# Patient Record
Sex: FEMALE | Race: OTHER | HISPANIC OR LATINO | URBAN - METROPOLITAN AREA
[De-identification: names, ages, dates, MRNs, and addresses within clinical notes are randomized per-mention and may not be internally consistent; named-entity substitution may affect disease eponyms.]

---

## 2021-08-22 DIAGNOSIS — Z01.818 ENCOUNTER FOR OTHER PREPROCEDURAL EXAMINATION: ICD-10-CM

## 2021-08-22 PROBLEM — Z00.00 ENCOUNTER FOR PREVENTIVE HEALTH EXAMINATION: Status: ACTIVE | Noted: 2021-08-22

## 2021-08-26 ENCOUNTER — OUTPATIENT (OUTPATIENT)
Dept: OUTPATIENT SERVICES | Facility: HOSPITAL | Age: 36
LOS: 1 days | End: 2021-08-26

## 2021-08-26 DIAGNOSIS — Z01.818 ENCOUNTER FOR OTHER PREPROCEDURAL EXAMINATION: ICD-10-CM

## 2021-08-26 LAB
BASOPHILS # BLD AUTO: 0.02 K/UL — SIGNIFICANT CHANGE UP (ref 0–0.2)
BASOPHILS NFR BLD AUTO: 0.2 % — SIGNIFICANT CHANGE UP (ref 0–2)
BLD GP AB SCN SERPL QL: SIGNIFICANT CHANGE UP
EOSINOPHIL # BLD AUTO: 0.14 K/UL — SIGNIFICANT CHANGE UP (ref 0–0.5)
EOSINOPHIL NFR BLD AUTO: 1.2 % — SIGNIFICANT CHANGE UP (ref 0–6)
HCT VFR BLD CALC: 31.3 % — LOW (ref 34.5–45)
HGB BLD-MCNC: 10.7 G/DL — LOW (ref 11.5–15.5)
HIV 1 & 2 AB SERPL IA.RAPID: SIGNIFICANT CHANGE UP
IMM GRANULOCYTES NFR BLD AUTO: 0.7 % — SIGNIFICANT CHANGE UP (ref 0–1.5)
LYMPHOCYTES # BLD AUTO: 2.65 K/UL — SIGNIFICANT CHANGE UP (ref 1–3.3)
LYMPHOCYTES # BLD AUTO: 23.4 % — SIGNIFICANT CHANGE UP (ref 13–44)
MCHC RBC-ENTMCNC: 29.6 PG — SIGNIFICANT CHANGE UP (ref 27–34)
MCHC RBC-ENTMCNC: 34.2 GM/DL — SIGNIFICANT CHANGE UP (ref 32–36)
MCV RBC AUTO: 86.7 FL — SIGNIFICANT CHANGE UP (ref 80–100)
MONOCYTES # BLD AUTO: 0.95 K/UL — HIGH (ref 0–0.9)
MONOCYTES NFR BLD AUTO: 8.4 % — SIGNIFICANT CHANGE UP (ref 2–14)
NEUTROPHILS # BLD AUTO: 7.49 K/UL — HIGH (ref 1.8–7.4)
NEUTROPHILS NFR BLD AUTO: 66.1 % — SIGNIFICANT CHANGE UP (ref 43–77)
PLATELET # BLD AUTO: 222 K/UL — SIGNIFICANT CHANGE UP (ref 150–400)
RBC # BLD: 3.61 M/UL — LOW (ref 3.8–5.2)
RBC # FLD: 13.8 % — SIGNIFICANT CHANGE UP (ref 10.3–14.5)
WBC # BLD: 11.33 K/UL — HIGH (ref 3.8–10.5)
WBC # FLD AUTO: 11.33 K/UL — HIGH (ref 3.8–10.5)

## 2021-08-27 ENCOUNTER — APPOINTMENT (OUTPATIENT)
Dept: DISASTER EMERGENCY | Facility: CLINIC | Age: 36
End: 2021-08-27

## 2021-08-27 LAB — HIV 1+2 AB+HIV1 P24 AG SERPL QL IA: SIGNIFICANT CHANGE UP

## 2021-08-28 LAB — SARS-COV-2 N GENE NPH QL NAA+PROBE: NOT DETECTED

## 2021-08-29 ENCOUNTER — TRANSCRIPTION ENCOUNTER (OUTPATIENT)
Age: 36
End: 2021-08-29

## 2021-08-30 ENCOUNTER — INPATIENT (INPATIENT)
Facility: HOSPITAL | Age: 36
LOS: 1 days | Discharge: HOME | End: 2021-09-01
Attending: OBSTETRICS & GYNECOLOGY | Admitting: OBSTETRICS & GYNECOLOGY
Payer: COMMERCIAL

## 2021-08-30 ENCOUNTER — RESULT REVIEW (OUTPATIENT)
Age: 36
End: 2021-08-30

## 2021-08-30 ENCOUNTER — TRANSCRIPTION ENCOUNTER (OUTPATIENT)
Age: 36
End: 2021-08-30

## 2021-08-30 VITALS
HEART RATE: 74 BPM | WEIGHT: 190.04 LBS | HEIGHT: 62 IN | DIASTOLIC BLOOD PRESSURE: 57 MMHG | RESPIRATION RATE: 20 BRPM | TEMPERATURE: 99 F | SYSTOLIC BLOOD PRESSURE: 104 MMHG

## 2021-08-30 DIAGNOSIS — O34.219 MATERNAL CARE FOR UNSPECIFIED TYPE SCAR FROM PREVIOUS CESAREAN DELIVERY: ICD-10-CM

## 2021-08-30 LAB
BLD GP AB SCN SERPL QL: SIGNIFICANT CHANGE UP
COVID-19 SPIKE DOMAIN AB INTERP: POSITIVE
COVID-19 SPIKE DOMAIN ANTIBODY RESULT: 82.8 U/ML — HIGH
GLUCOSE BLDC GLUCOMTR-MCNC: 128 MG/DL — HIGH (ref 70–99)
GLUCOSE BLDC GLUCOMTR-MCNC: 93 MG/DL — SIGNIFICANT CHANGE UP (ref 70–99)
MEV IGG SER-ACNC: 82.8 AU/ML — SIGNIFICANT CHANGE UP
MEV IGG+IGM SER-IMP: POSITIVE — SIGNIFICANT CHANGE UP
SARS-COV-2 IGG+IGM SERPL QL IA: 82.8 U/ML — HIGH
SARS-COV-2 IGG+IGM SERPL QL IA: POSITIVE
T PALLIDUM AB TITR SER: NEGATIVE — SIGNIFICANT CHANGE UP

## 2021-08-30 PROCEDURE — 88302 TISSUE EXAM BY PATHOLOGIST: CPT | Mod: 26

## 2021-08-30 PROCEDURE — 88307 TISSUE EXAM BY PATHOLOGIST: CPT | Mod: 26

## 2021-08-30 RX ORDER — OXYCODONE HYDROCHLORIDE 5 MG/1
5 TABLET ORAL ONCE
Refills: 0 | Status: DISCONTINUED | OUTPATIENT
Start: 2021-08-30 | End: 2021-09-01

## 2021-08-30 RX ORDER — ACETAMINOPHEN 500 MG
975 TABLET ORAL
Refills: 0 | Status: DISCONTINUED | OUTPATIENT
Start: 2021-08-30 | End: 2021-09-01

## 2021-08-30 RX ORDER — CITRIC ACID/SODIUM CITRATE 300-500 MG
30 SOLUTION, ORAL ORAL ONCE
Refills: 0 | Status: COMPLETED | OUTPATIENT
Start: 2021-08-30 | End: 2021-08-30

## 2021-08-30 RX ORDER — KETOROLAC TROMETHAMINE 30 MG/ML
30 SYRINGE (ML) INJECTION EVERY 6 HOURS
Refills: 0 | Status: DISCONTINUED | OUTPATIENT
Start: 2021-08-30 | End: 2021-08-31

## 2021-08-30 RX ORDER — ENOXAPARIN SODIUM 100 MG/ML
40 INJECTION SUBCUTANEOUS EVERY 24 HOURS
Refills: 0 | Status: DISCONTINUED | OUTPATIENT
Start: 2021-08-30 | End: 2021-09-01

## 2021-08-30 RX ORDER — SODIUM CHLORIDE 9 MG/ML
1000 INJECTION, SOLUTION INTRAVENOUS
Refills: 0 | Status: DISCONTINUED | OUTPATIENT
Start: 2021-08-30 | End: 2021-09-01

## 2021-08-30 RX ORDER — DIPHENHYDRAMINE HCL 50 MG
25 CAPSULE ORAL EVERY 6 HOURS
Refills: 0 | Status: DISCONTINUED | OUTPATIENT
Start: 2021-08-30 | End: 2021-09-01

## 2021-08-30 RX ORDER — SIMETHICONE 80 MG/1
80 TABLET, CHEWABLE ORAL EVERY 4 HOURS
Refills: 0 | Status: DISCONTINUED | OUTPATIENT
Start: 2021-08-30 | End: 2021-09-01

## 2021-08-30 RX ORDER — OXYTOCIN 10 UNIT/ML
333.33 VIAL (ML) INJECTION
Qty: 20 | Refills: 0 | Status: DISCONTINUED | OUTPATIENT
Start: 2021-08-30 | End: 2021-09-01

## 2021-08-30 RX ORDER — LANOLIN
1 OINTMENT (GRAM) TOPICAL EVERY 6 HOURS
Refills: 0 | Status: DISCONTINUED | OUTPATIENT
Start: 2021-08-30 | End: 2021-09-01

## 2021-08-30 RX ORDER — MAGNESIUM HYDROXIDE 400 MG/1
30 TABLET, CHEWABLE ORAL
Refills: 0 | Status: DISCONTINUED | OUTPATIENT
Start: 2021-08-30 | End: 2021-09-01

## 2021-08-30 RX ORDER — IBUPROFEN 200 MG
600 TABLET ORAL EVERY 6 HOURS
Refills: 0 | Status: COMPLETED | OUTPATIENT
Start: 2021-08-30 | End: 2022-07-29

## 2021-08-30 RX ORDER — CEFAZOLIN SODIUM 1 G
2000 VIAL (EA) INJECTION ONCE
Refills: 0 | Status: COMPLETED | OUTPATIENT
Start: 2021-08-30 | End: 2021-08-30

## 2021-08-30 RX ORDER — OXYCODONE HYDROCHLORIDE 5 MG/1
5 TABLET ORAL
Refills: 0 | Status: DISCONTINUED | OUTPATIENT
Start: 2021-08-30 | End: 2021-09-01

## 2021-08-30 RX ORDER — TETANUS TOXOID, REDUCED DIPHTHERIA TOXOID AND ACELLULAR PERTUSSIS VACCINE, ADSORBED 5; 2.5; 8; 8; 2.5 [IU]/.5ML; [IU]/.5ML; UG/.5ML; UG/.5ML; UG/.5ML
0.5 SUSPENSION INTRAMUSCULAR ONCE
Refills: 0 | Status: COMPLETED | OUTPATIENT
Start: 2021-08-30

## 2021-08-30 RX ORDER — FAMOTIDINE 10 MG/ML
20 INJECTION INTRAVENOUS ONCE
Refills: 0 | Status: COMPLETED | OUTPATIENT
Start: 2021-08-30 | End: 2021-08-30

## 2021-08-30 RX ORDER — SODIUM CHLORIDE 9 MG/ML
1000 INJECTION, SOLUTION INTRAVENOUS ONCE
Refills: 0 | Status: COMPLETED | OUTPATIENT
Start: 2021-08-30 | End: 2021-08-30

## 2021-08-30 RX ORDER — SODIUM CHLORIDE 9 MG/ML
1000 INJECTION, SOLUTION INTRAVENOUS
Refills: 0 | Status: DISCONTINUED | OUTPATIENT
Start: 2021-08-30 | End: 2021-08-30

## 2021-08-30 RX ADMIN — Medication 30 MILLIGRAM(S): at 23:53

## 2021-08-30 RX ADMIN — SODIUM CHLORIDE 125 MILLILITER(S): 9 INJECTION, SOLUTION INTRAVENOUS at 21:56

## 2021-08-30 RX ADMIN — SODIUM CHLORIDE 125 MILLILITER(S): 9 INJECTION, SOLUTION INTRAVENOUS at 07:06

## 2021-08-30 RX ADMIN — Medication 30 MILLILITER(S): at 07:20

## 2021-08-30 RX ADMIN — Medication 1000 MILLIUNIT(S)/MIN: at 08:31

## 2021-08-30 RX ADMIN — Medication 30 MILLIGRAM(S): at 22:53

## 2021-08-30 RX ADMIN — Medication 975 MILLIGRAM(S): at 21:50

## 2021-08-30 RX ADMIN — Medication 975 MILLIGRAM(S): at 20:56

## 2021-08-30 RX ADMIN — SODIUM CHLORIDE 2000 MILLILITER(S): 9 INJECTION, SOLUTION INTRAVENOUS at 06:47

## 2021-08-30 RX ADMIN — Medication 100 MILLIGRAM(S): at 08:05

## 2021-08-30 RX ADMIN — Medication 975 MILLIGRAM(S): at 16:35

## 2021-08-30 RX ADMIN — Medication 30 MILLIGRAM(S): at 17:01

## 2021-08-30 RX ADMIN — FAMOTIDINE 20 MILLIGRAM(S): 10 INJECTION INTRAVENOUS at 07:20

## 2021-08-30 RX ADMIN — Medication 30 MILLIGRAM(S): at 11:17

## 2021-08-30 RX ADMIN — Medication 975 MILLIGRAM(S): at 15:47

## 2021-08-30 RX ADMIN — Medication 30 MILLIGRAM(S): at 11:02

## 2021-08-30 RX ADMIN — Medication 30 MILLIGRAM(S): at 18:00

## 2021-08-30 RX ADMIN — ENOXAPARIN SODIUM 40 MILLIGRAM(S): 100 INJECTION SUBCUTANEOUS at 20:56

## 2021-08-30 NOTE — DISCHARGE NOTE OB - CARE PROVIDER_API CALL
Joselyn Curiel)  Obstetrics and Gynecology  [address] [place] [address]  Phone: [phone]  Fax: [phone]  Follow Up Time:

## 2021-08-30 NOTE — OB PROVIDER H&P - NSHPPHYSICALEXAM_GEN_ALL_CORE
Vital Signs Last 24 Hrs  T(C): 37.1 (30 Aug 2021 06:31), Max: 37.1 (30 Aug 2021 06:27)  T(F): 98.78 (30 Aug 2021 06:31), Max: 98.78 (30 Aug 2021 06:31)  HR: 74 (30 Aug 2021 06:44) (74 - 74)  BP: 104/57 (30 Aug 2021 06:44) (104/57 - 104/57)  RR: 20 (30 Aug 2021 06:31) (20 - 20)    Gen: no acute distress  CV: regular rate and ryhthmn   Pulm: clear bilaterally to auscultation  Abd: nontender; gravid  Ext: no calf tenderness; +1 swelling     Tracing: baseline 120, moderate variability, +accels, no decels  Guerra: irregular ctx <5 in 10 mins  SVE: deferred  Ultrasound: vertex

## 2021-08-30 NOTE — OB PROVIDER H&P - ASSESSMENT
Patient is a 36y.o.  at 39 weeks gestation admitted to L&D for repeat  section.       - consent  - admission labs  - Diet: NPO  - IV hydration  - continuous toco and fetal heart monitoring until surgery  - preop antibiotics 2g ancef  - GBS unknown    To be discussed with  ___ Patient is a 36y.o.  at 39 weeks gestation admitted to L&D for repeat  section.       - consent  - admission labs  - Diet: NPO  - IV hydration  - continuous toco and fetal heart monitoring until surgery  - preop antibiotics 2g ancef  - NST reactive    discussed with attending Dr. Curiel

## 2021-08-30 NOTE — OB RN DELIVERY SUMMARY - NSSELHIDDEN_OBGYN_ALL_OB_FT
[NS_DeliveryRN_OBGYN_ALL_OB_FT:[email or web address]=] [NS_DeliveryRN_OBGYN_ALL_OB_FT:[email or web address]=],[NS_DeliveryAttending1_OBGYN_ALL_OB_FT:[id number]]

## 2021-08-30 NOTE — DISCHARGE NOTE OB - CARE PLAN
Principal Discharge DX:	 delivery delivered  Assessment and plan of treatment:	Patient should transition to regular activity level. Resume regular diet. Patient should follow up with her OB for a postpartum checkup 1-2 weeks after delivery. Patient should call her doctor sooner if she develops a fever or uncontrolled vaginal bleeding. Please call sooner if there are any other concerns.   1

## 2021-08-30 NOTE — DISCHARGE NOTE OB - PATIENT PORTAL LINK FT
You can access the FollowMyHealth Patient Portal offered by [facility] by registering at the following website: [email or web address]. By joining [email or web address]’s FollowMyHealth portal, you will also be able to view your health information using other applications (apps) compatible with our system.

## 2021-08-30 NOTE — DISCHARGE NOTE OB - MEDICATION SUMMARY - MEDICATIONS TO TAKE
I will START or STAY ON the medications listed below when I get home from the hospital:    ibuprofen 600 mg oral tablet  -- 1 tab(s) by mouth every 6 hours MDD:4  -- Do not take this drug if you are pregnant.  It is very important that you take or use this exactly as directed.  Do not skip doses or discontinue unless directed by your doctor.  May cause drowsiness or dizziness.  Obtain medical advice before taking any non-prescription drugs as some may affect the action of this medication.  Take with food or milk.    -- Indication: For PAIN    oxycodone-acetaminophen 5 mg-325 mg oral tablet  -- 1 tab(s) by mouth every 8 hours MDD:3  -- Caution federal law prohibits the transfer of this drug to any person other  than the person for whom it was prescribed.  May cause drowsiness.  Alcohol may intensify this effect.  Use care when operating dangerous machinery.  This prescription cannot be refilled.  This product contains acetaminophen.  Do not use  with any other product containing acetaminophen to prevent possible liver damage.  Using more of this medication than prescribed may cause serious breathing problems.    -- Indication: For PAIN    MiraLax oral powder for reconstitution  -- 17 gram(s) by mouth once a day   -- Dilute this medication with liquid before administration.  It is very important that you take or use this exactly as directed.  Do not skip doses or discontinue unless directed by your doctor.    -- Indication: For CONSTIPATION

## 2021-08-30 NOTE — OB RN DELIVERY SUMMARY - NS_SEPSISRSKCALC_OBGYN_ALL_OB_FT
EOS calculated successfully. EOS Risk Factor: 0.5/1000 live births ([facility] national incidence); GA=39w;Temp=98.78; ROM=0.017; GBS='Unknown'; Antibiotics='No antibiotics or any antibiotics < 2 hrs prior to birth'

## 2021-08-30 NOTE — DISCHARGE NOTE OB - PLAN OF CARE
Patient should transition to regular activity level. Resume regular diet. Patient should follow up with her OB for a postpartum checkup 1-2 weeks after delivery. Patient should call her doctor sooner if she develops a fever or uncontrolled vaginal bleeding. Please call sooner if there are any other concerns.

## 2021-08-30 NOTE — DISCHARGE NOTE OB - HOSPITAL COURSE
LANCE LAW is a 36y  s/p uncomplicated vaccuum assisted repeat CS and bilateral salpingectomy POD #2                          7.8    16.27 )-----------( 175      ( 31 Aug 2021 06:20 )             23.3

## 2021-08-30 NOTE — OB NEONATOLOGY/PEDIATRICIAN DELIVERY SUMMARY - NSPEDSNEONOTESA_OBGYN_ALL_OB_FT
Called to repeat  for vacuum at 39w0d to a 35yo  mother, blood type AB+, GBS unknown, remaining serologies negative.  Infant delivered vigorous with spontaneous cry.  Delayed cord clamping x 20 seconds.  Brought to warmer, dried/stimulated, then developed secondary apnea around 3 min of life requiring PPV ~15 sec by nurse.  NICU attending arrived shortly after discontinuation of PPV.  Female infant well-appearing, transitioning well, no distress, exam grossly unremarkable.  Pre-ductal saturation appropriate for minute of life.  Plan for routine  care if remains well-appearing.

## 2021-08-30 NOTE — OB PROVIDER H&P - HISTORY OF PRESENT ILLNESS
Patient is a 36 year old  at 39 weeks by first trimester sono GA by who presents to L&D for repeat C/S and BS.     RIAN: 21   LMP: 20     Pregnancy course:     GDMA1   Proteinuria   AMA   CF carrier   BV     Obhx:   pC/S (2018) @ 39wks – Male, C/b FGR and GDM   GynHx: Hx of BV, HSV   Pmhx: denies   Pshx: denies   Meds: PNV, Vit D, Calcium   Allergies: Latex - rash   SH: denies     BMI:    Ultrasound: vertex, anterior placenta 21   EFW: 2222g on   Patient is a 36 year old  at 39 weeks by first trimester sono GA by who presents to L&D for repeat C/S and BS.   Denies vaginal bleeding, loss of fluid or regular contractions. +fetal movement, unchanged.     RIAN: 21   LMP: 20     Pregnancy course:     GDMA1   Proteinuria   AMA   CF carrier   BV     Obhx:   pC/S (2018) @ 39wks – Male, C/b FGR and GDM   GynHx: Hx of BV, HSV   Pmhx: denies   Pshx: denies   Meds: PNV, Vit D, Calcium   Allergies: Latex - rash   SH: denies     BMI:    Ultrasound: vertex, anterior placenta 21   EFW: 2222g on

## 2021-08-30 NOTE — OB PROVIDER DELIVERY SUMMARY - NSPROVIDERDELIVERYNOTE_OBGYN_ALL_OB_FT
Pt taken to the OR for repeat C/S scheduled.  Not in labor.  Spinal anesthesia.  Low transverse  section was performed.  Vaccuum-assisted with kiwi vaccuum with one pull, no popoffs due to difficulty delivery through hysterotomy of vertex presentation with anterior placenta and.   Delivered live female infant, vtx, through moderate amount of clear amniotic fluid.  No nuchal cord.  Uterus, tubes, ovaries WNL.   Bilateral salpingectomy with ligature performed.  Hysterotomy was reapproximated with suture, excellent hemostasis obtained.  fascia was reapproximated with suture, excellent hemostasis obtained.  skin closed in subcuticular fashion    [place] weight [id number]  Skin-to-skin initiated in OR and continued into RR.  APGAR 8/9.   QBL: 622  Uop: 575  No intraoperative complications

## 2021-08-30 NOTE — OB RN INTRAOPERATIVE NOTE - NSSELHIDDEN_OBGYN_ALL_OB_FT
[NS_DeliveryRN_OBGYN_ALL_OB_FT:[email or web address]=] [NS_DeliveryRN_OBGYN_ALL_OB_FT:[email or web address]=],[NS_DeliveryAssist1_OBGYN_ALL_OB_FT:[id number]=]

## 2021-08-30 NOTE — OB RN DELIVERY SUMMARY - BABY A: WEIGHT IN POUNDS (FROM GRAMS), DELIVERY
Patient is checking on the status of his med request. Patient will be out.  Patient is aware that his PCP is out 11/8/19.   6

## 2021-08-31 LAB
BASOPHILS # BLD AUTO: 0.04 K/UL — SIGNIFICANT CHANGE UP (ref 0–0.2)
BASOPHILS NFR BLD AUTO: 0.2 % — SIGNIFICANT CHANGE UP (ref 0–2)
EOSINOPHIL # BLD AUTO: 0.03 K/UL — SIGNIFICANT CHANGE UP (ref 0–0.5)
EOSINOPHIL NFR BLD AUTO: 0.2 % — SIGNIFICANT CHANGE UP (ref 0–6)
GLUCOSE BLDC GLUCOMTR-MCNC: 103 MG/DL — HIGH (ref 70–99)
GLUCOSE BLDC GLUCOMTR-MCNC: 137 MG/DL — HIGH (ref 70–99)
HCT VFR BLD CALC: 23.3 % — LOW (ref 34.5–45)
HGB BLD-MCNC: 7.8 G/DL — LOW (ref 11.5–15.5)
IMM GRANULOCYTES NFR BLD AUTO: 0.6 % — SIGNIFICANT CHANGE UP (ref 0–1.5)
LYMPHOCYTES # BLD AUTO: 26.2 % — SIGNIFICANT CHANGE UP (ref 13–44)
LYMPHOCYTES # BLD AUTO: 4.26 K/UL — HIGH (ref 1–3.3)
MCHC RBC-ENTMCNC: 29.7 PG — SIGNIFICANT CHANGE UP (ref 27–34)
MCHC RBC-ENTMCNC: 33.5 GM/DL — SIGNIFICANT CHANGE UP (ref 32–36)
MCV RBC AUTO: 88.6 FL — SIGNIFICANT CHANGE UP (ref 80–100)
MONOCYTES # BLD AUTO: 1.26 K/UL — HIGH (ref 0–0.9)
MONOCYTES NFR BLD AUTO: 7.7 % — SIGNIFICANT CHANGE UP (ref 2–14)
NEUTROPHILS # BLD AUTO: 10.59 K/UL — HIGH (ref 1.8–7.4)
NEUTROPHILS NFR BLD AUTO: 65.1 % — SIGNIFICANT CHANGE UP (ref 43–77)
PLATELET # BLD AUTO: 175 K/UL — SIGNIFICANT CHANGE UP (ref 150–400)
RBC # BLD: 2.63 M/UL — LOW (ref 3.8–5.2)
RBC # FLD: 13.8 % — SIGNIFICANT CHANGE UP (ref 10.3–14.5)
WBC # BLD: 16.27 K/UL — HIGH (ref 3.8–10.5)
WBC # FLD AUTO: 16.27 K/UL — HIGH (ref 3.8–10.5)

## 2021-08-31 RX ORDER — IRON SUCROSE 20 MG/ML
300 INJECTION, SOLUTION INTRAVENOUS ONCE
Refills: 0 | Status: COMPLETED | OUTPATIENT
Start: 2021-08-31 | End: 2021-08-31

## 2021-08-31 RX ORDER — IBUPROFEN 200 MG
1 TABLET ORAL
Qty: 30 | Refills: 0
Start: 2021-08-31

## 2021-08-31 RX ORDER — POLYETHYLENE GLYCOL 3350 17 G/17G
17 POWDER, FOR SOLUTION ORAL
Qty: 119 | Refills: 0
Start: 2021-08-31 | End: 2021-09-06

## 2021-08-31 RX ORDER — TETANUS TOXOID, REDUCED DIPHTHERIA TOXOID AND ACELLULAR PERTUSSIS VACCINE, ADSORBED 5; 2.5; 8; 8; 2.5 [IU]/.5ML; [IU]/.5ML; UG/.5ML; UG/.5ML; UG/.5ML
0.5 SUSPENSION INTRAMUSCULAR ONCE
Refills: 0 | Status: COMPLETED | OUTPATIENT
Start: 2021-08-31 | End: 2021-08-31

## 2021-08-31 RX ORDER — FERROUS SULFATE 325(65) MG
325 TABLET ORAL EVERY 24 HOURS
Refills: 0 | Status: DISCONTINUED | OUTPATIENT
Start: 2021-08-31 | End: 2021-08-31

## 2021-08-31 RX ORDER — IBUPROFEN 200 MG
600 TABLET ORAL EVERY 6 HOURS
Refills: 0 | Status: DISCONTINUED | OUTPATIENT
Start: 2021-08-31 | End: 2021-09-01

## 2021-08-31 RX ADMIN — SIMETHICONE 80 MILLIGRAM(S): 80 TABLET, CHEWABLE ORAL at 23:51

## 2021-08-31 RX ADMIN — Medication 975 MILLIGRAM(S): at 16:03

## 2021-08-31 RX ADMIN — Medication 600 MILLIGRAM(S): at 13:07

## 2021-08-31 RX ADMIN — Medication 975 MILLIGRAM(S): at 21:50

## 2021-08-31 RX ADMIN — Medication 600 MILLIGRAM(S): at 18:40

## 2021-08-31 RX ADMIN — Medication 600 MILLIGRAM(S): at 23:41

## 2021-08-31 RX ADMIN — Medication 975 MILLIGRAM(S): at 10:34

## 2021-08-31 RX ADMIN — Medication 30 MILLIGRAM(S): at 06:32

## 2021-08-31 RX ADMIN — ENOXAPARIN SODIUM 40 MILLIGRAM(S): 100 INJECTION SUBCUTANEOUS at 20:58

## 2021-08-31 RX ADMIN — Medication 975 MILLIGRAM(S): at 20:58

## 2021-08-31 RX ADMIN — Medication 600 MILLIGRAM(S): at 12:10

## 2021-08-31 RX ADMIN — Medication 975 MILLIGRAM(S): at 04:25

## 2021-08-31 RX ADMIN — IRON SUCROSE 176.67 MILLIGRAM(S): 20 INJECTION, SOLUTION INTRAVENOUS at 17:09

## 2021-08-31 RX ADMIN — Medication 30 MILLIGRAM(S): at 07:34

## 2021-08-31 RX ADMIN — Medication 975 MILLIGRAM(S): at 09:37

## 2021-08-31 RX ADMIN — Medication 975 MILLIGRAM(S): at 17:03

## 2021-08-31 RX ADMIN — TETANUS TOXOID, REDUCED DIPHTHERIA TOXOID AND ACELLULAR PERTUSSIS VACCINE, ADSORBED 0.5 MILLILITER(S): 5; 2.5; 8; 8; 2.5 SUSPENSION INTRAMUSCULAR at 05:41

## 2021-08-31 RX ADMIN — Medication 975 MILLIGRAM(S): at 03:24

## 2021-08-31 RX ADMIN — Medication 600 MILLIGRAM(S): at 18:09

## 2021-09-01 VITALS
RESPIRATION RATE: 18 BRPM | DIASTOLIC BLOOD PRESSURE: 76 MMHG | TEMPERATURE: 98 F | HEART RATE: 80 BPM | OXYGEN SATURATION: 98 % | SYSTOLIC BLOOD PRESSURE: 117 MMHG

## 2021-09-01 LAB
HCT VFR BLD CALC: 22.4 % — LOW (ref 34.5–45)
HGB BLD-MCNC: 7.8 G/DL — LOW (ref 11.5–15.5)
MCHC RBC-ENTMCNC: 30.4 PG — SIGNIFICANT CHANGE UP (ref 27–34)
MCHC RBC-ENTMCNC: 34.8 GM/DL — SIGNIFICANT CHANGE UP (ref 32–36)
MCV RBC AUTO: 87.2 FL — SIGNIFICANT CHANGE UP (ref 80–100)
PLATELET # BLD AUTO: 201 K/UL — SIGNIFICANT CHANGE UP (ref 150–400)
RBC # BLD: 2.57 M/UL — LOW (ref 3.8–5.2)
RBC # FLD: 13.9 % — SIGNIFICANT CHANGE UP (ref 10.3–14.5)
WBC # BLD: 13.96 K/UL — HIGH (ref 3.8–10.5)
WBC # FLD AUTO: 13.96 K/UL — HIGH (ref 3.8–10.5)

## 2021-09-01 PROCEDURE — 36415 COLL VENOUS BLD VENIPUNCTURE: CPT

## 2021-09-01 PROCEDURE — 76815 OB US LIMITED FETUS(S): CPT

## 2021-09-01 PROCEDURE — 88307 TISSUE EXAM BY PATHOLOGIST: CPT

## 2021-09-01 PROCEDURE — 86769 SARS-COV-2 COVID-19 ANTIBODY: CPT

## 2021-09-01 PROCEDURE — C1765: CPT

## 2021-09-01 PROCEDURE — 59025 FETAL NON-STRESS TEST: CPT

## 2021-09-01 PROCEDURE — 85027 COMPLETE CBC AUTOMATED: CPT

## 2021-09-01 PROCEDURE — 85025 COMPLETE CBC W/AUTO DIFF WBC: CPT

## 2021-09-01 PROCEDURE — 86850 RBC ANTIBODY SCREEN: CPT

## 2021-09-01 PROCEDURE — 82962 GLUCOSE BLOOD TEST: CPT

## 2021-09-01 PROCEDURE — 86765 RUBEOLA ANTIBODY: CPT

## 2021-09-01 PROCEDURE — 86901 BLOOD TYPING SEROLOGIC RH(D): CPT

## 2021-09-01 PROCEDURE — 86900 BLOOD TYPING SEROLOGIC ABO: CPT

## 2021-09-01 PROCEDURE — 90715 TDAP VACCINE 7 YRS/> IM: CPT

## 2021-09-01 PROCEDURE — 59050 FETAL MONITOR W/REPORT: CPT

## 2021-09-01 PROCEDURE — G0463: CPT

## 2021-09-01 PROCEDURE — 86780 TREPONEMA PALLIDUM: CPT

## 2021-09-01 PROCEDURE — 88302 TISSUE EXAM BY PATHOLOGIST: CPT

## 2021-09-01 RX ADMIN — Medication 600 MILLIGRAM(S): at 17:30

## 2021-09-01 RX ADMIN — Medication 600 MILLIGRAM(S): at 11:44

## 2021-09-01 RX ADMIN — Medication 600 MILLIGRAM(S): at 06:44

## 2021-09-01 RX ADMIN — Medication 975 MILLIGRAM(S): at 03:30

## 2021-09-01 RX ADMIN — Medication 975 MILLIGRAM(S): at 02:35

## 2021-09-01 RX ADMIN — Medication 600 MILLIGRAM(S): at 12:14

## 2021-09-01 RX ADMIN — Medication 600 MILLIGRAM(S): at 00:36

## 2021-09-01 RX ADMIN — Medication 600 MILLIGRAM(S): at 18:00

## 2021-09-01 RX ADMIN — Medication 600 MILLIGRAM(S): at 05:45

## 2021-09-01 NOTE — PROGRESS NOTE ADULT - ASSESSMENT
ASSESSMENT:  LANCE LAW is a 36y  s/p uncomplicated vaccuum assisted repeat CS and bilateral salpingectomy POD #2  female infant  Patient has no complaints at this time.  Incision healing well.  Rh status +  Rubella and Rubeola Immune  VSS     #anemia  Hgb 10.7-> 7.8 > f/u AM CBC  IV iron x1 given, to d/c with PO iron  asymptomatic    #leukocytosis  - resolved     #Postpartum   - Continue routine post-operative  and post-partum care  - Regular diet, advance as tolerated  - Continue with current pain management  - Encourage maternal- bonding  - Encourage ambulation  - Continue with SCDs and prophylactic Lovenox for DVT ppx  - Dispo: Home today with attending approval      
    ASSESSMENT:  LANCE LAW is a 36y  s/p uncomplicated vaccuum assisted repeat CS and bilateral salpingectomy POD #1  female infant  Patient has no complaints at this time.  Incision healing well.  Rh status +  Rubella and Rubeola Immune  VSS     #anemia  Hgb 10.7-> 7.8  PO iron started  asymptomatic    #failed TOV  -failed TOV overnight   bladder scanned 891cc overnight,  wagner replaced and to be discontinued at 6AM  pending TOV at approximately noon      #leukocytosis  -increased from 10>16.27  -afebrile  -continue to monitor    #Postpartum   - Continue routine post-operative  and post-partum care  - Regular diet, advance as tolerated  - Continue with current pain management  - Encourage maternal- bonding  - Encourage ambulation  - Continue with SCDs and prophylactic Lovenox for DVT ppx  - Dispo: continue inpatient care;

## 2021-09-01 NOTE — PROGRESS NOTE ADULT - SUBJECTIVE AND OBJECTIVE BOX
Delivery Post Partum Progress Note    LANCE LAW is a 36y  s/p uncomplicated vaccuum assisted repeat CS and bilateral salpingectomy POD #2  female infant    Patient was seen and examined at bedside.     SUBJECTIVE:  Reports feeling well this morning  Pain is well controlled with PRN pain medication  Tolerating PO without N/V  +flatus  +BM   Voiding spontaneously  Ambulating without assistance  Reports minimal lochia   Denies fevers, chills, shortness of breath, headaches, chest pain, vision changes or calf pain    OBJECTIVE:  Physical exam:  General: AOx3, NAD.  Heart: RRR. S1S2.  Lungs: CTABL. Good airflow bilaterally.   Abdomen: +BS, Soft, appropriately tender, mild distention; no guarding or rebound tenderness, firm uterine fundus at umbilicus  Incision: dry and intact with steri strips  Ext: No DVT signs, warm extremities.    Vital Signs Last 24 Hrs  T(C): 36.8 (01 Sep 2021 04:24), Max: 36.9 (31 Aug 2021 15:37)  T(F): 98.2 (01 Sep 2021 04:24), Max: 98.4 (31 Aug 2021 15:37)  HR: 94 (01 Sep 2021 04:24) (76 - 94)  BP: 132/80 (01 Sep 2021 04:24) (99/64 - 132/80)  BP(mean): --  RR: 18 (01 Sep 2021 04:24) (18 - 18)  SpO2: 97% (01 Sep 2021 04:24) (97% - 98%)    LABS:                        7.8    16.27 )-----------( 175      ( 31 Aug 2021 06:20 )             23.3       
 Delivery Post Partum Progress Note    LANCE LAW is a 36y  s/p uncomplicated vaccuum assisted repeat CS and bilateral salpingectomy POD #1  female infant      Patient was seen and examined at bedside.     SUBJECTIVE:  failed TOV overnight and bladder scanned 891cc overnight, wagner replaced   Reports feeling well this morning  Pain is well controlled with PRN pain medication  Tolerating PO without N/V  +flatus  No BM   Voiding spontaneously  Ambulating without assistance  Reports minimal lochia   Denies fevers, chills, shortness of breath, headaches, chest pain, vision changes or calf pain    OBJECTIVE:  Physical exam:  General: AOx3, NAD.  Heart: RRR. S1S2.  Lungs: CTABL. Good airflow bilaterally.   Abdomen: +BS, Soft, appropriately tender, mild distention; no guarding or rebound tenderness, firm uterine fundus at umbilicus  Incision: dry and intact with steri strips; dry serosanginous blood along s miguel strips. No erythema or discharge.  Ext: No DVT signs, warm extremities.    Vital Signs Last 24 Hrs  T(C): 36.7 (31 Aug 2021 07:55), Max: 37.1 (30 Aug 2021 16:07)  T(F): 98.1 (31 Aug 2021 07:55), Max: 98.7 (30 Aug 2021 16:07)  HR: 76 (31 Aug 2021 07:55) (65 - 85)  BP: 101/64 (31 Aug 2021 07:55) (99/50 - 122/77)  RR: 18 (31 Aug 2021 07:55) (14 - 18)  SpO2: 97% (31 Aug 2021 07:55) (96% - 99%)      21 @ 07:01  -  21 @ 07:00  --------------------------------------------------------  IN: 1100 mL / OUT: 5950 mL / NET: -4850 mL        LABS:                        7.8    16.27 )-----------( 175      ( 31 Aug 2021 06:20 )             23.3       
No complains  Pain controlled  tolerates PO, no flatus yet  Was not able to urinate yet  ambulating    afebrile  stable vitals  labs - mild anemia, Fs ok  abd soft, incision clean  uterus firm, lochia min  no calf tenderness    Plan  routine p/op care  IV iron today and tomorrow  repeat CBC tomorrow  Diabetic Diet  encourage ambulation  shower  vitamins  possible discharge home tomorrow

## 2021-09-03 LAB — SURGICAL PATHOLOGY STUDY: SIGNIFICANT CHANGE UP

## 2024-08-21 NOTE — OB RN PATIENT PROFILE - BREAST MILK IS MORE DIGESTIBLE, MAKING VOMITING, DIARRHEA, GAS AND CONSTIPATION LESS COMMON
Start the new medication for the restless legs-  take nightly before bed       Talk to Dr Agrawal about the [employer] for PRINCE        [facility] Financial Resources*  (Call [employer]/211 if need more resources.)      [employer] 211   Speak to a trained professional 24/7 who can connect you to essential community services including food, clothing, transportation, housing, utilities, employment services, childcare, and baby supplies. 211 serves nationwide.   [facility] for resources in [facility], [place] and [facility] in [place]; [place], [place], [place], and [facility] in [place].   [email or web address]/resources for resources in [facility], [place], [place], [place], [place], [place], [place], [facility], [place], [place], and [facility] in [place].     [facility]" Financial Assistance  What they offer: Financial assistance programs that are designed to assist you in finding resources that may help pay your hospital bill. Please click on the links below to learn more about the financial assistance programs available within our regions.  Phone Number: [phone]  How to apply for the [facility] Financial Assistance Program:       Option 1: To apply for financial assistance, a patient (or their family or other provider) should fill out the Financial Assistance Application. Copies of the Financial Assistance Application and the FAP may be obtained for free by calling the [facility] Customer Service department at [phone]   Option 2: The Financial Assistance Application and policy may be obtained for free by downloading a copy from the [employer] website:  https://www.[employer]/patient-resources/financial-assistance  [place] [facility] Care Assurance Program  What they offer:  Patients who need hospital care, but are unable to pay for it, may be eligible for free or reduced fee care at [facility] through the Hospital Care Assurance Program (HCAP). Applications for HCAP are accepted by the 
Statement Selected